# Patient Record
Sex: MALE | Race: WHITE | NOT HISPANIC OR LATINO | URBAN - METROPOLITAN AREA
[De-identification: names, ages, dates, MRNs, and addresses within clinical notes are randomized per-mention and may not be internally consistent; named-entity substitution may affect disease eponyms.]

---

## 2019-04-16 ENCOUNTER — IMPORTED ENCOUNTER (OUTPATIENT)
Dept: URBAN - METROPOLITAN AREA CLINIC 50 | Facility: CLINIC | Age: 69
End: 2019-04-16

## 2019-06-05 ENCOUNTER — IMPORTED ENCOUNTER (OUTPATIENT)
Dept: URBAN - METROPOLITAN AREA CLINIC 50 | Facility: CLINIC | Age: 69
End: 2019-06-05

## 2019-06-18 ENCOUNTER — IMPORTED ENCOUNTER (OUTPATIENT)
Dept: URBAN - METROPOLITAN AREA CLINIC 50 | Facility: CLINIC | Age: 69
End: 2019-06-18

## 2019-06-27 ENCOUNTER — IMPORTED ENCOUNTER (OUTPATIENT)
Dept: URBAN - METROPOLITAN AREA CLINIC 50 | Facility: CLINIC | Age: 69
End: 2019-06-27

## 2019-06-27 NOTE — PATIENT DISCUSSION
"""S/P IOL OS: Sensar AAB00 18.5 (Target: [place]) +Omidria.  Continue post operative instructions ""

## 2019-07-02 ENCOUNTER — IMPORTED ENCOUNTER (OUTPATIENT)
Dept: URBAN - METROPOLITAN AREA CLINIC 50 | Facility: CLINIC | Age: 69
End: 2019-07-02

## 2019-07-02 NOTE — PATIENT DISCUSSION
"""S/P IOL OS: Sensar AAB00 18.5 (Target: [place]) +Omidria.  Continue post operative instructions ""

## 2019-07-11 ENCOUNTER — IMPORTED ENCOUNTER (OUTPATIENT)
Dept: URBAN - METROPOLITAN AREA CLINIC 50 | Facility: CLINIC | Age: 69
End: 2019-07-11

## 2019-07-11 NOTE — PATIENT DISCUSSION
"""S/P IOL OD: Sensar AAB00 18.0 (Target: [place]) +Omidria. Continue post operative instructions and drops per schedule.  """

## 2019-07-16 ENCOUNTER — IMPORTED ENCOUNTER (OUTPATIENT)
Dept: URBAN - METROPOLITAN AREA CLINIC 50 | Facility: CLINIC | Age: 69
End: 2019-07-16

## 2019-07-16 NOTE — PATIENT DISCUSSION
"""S/P IOL OD: Sensar AAB00 18.0 (Target: [place]) +Omidria.  Continue post operative instructions and drops per schedule. "" ""Continue Lotemax Drops left eye twice a day

## 2019-08-06 ENCOUNTER — IMPORTED ENCOUNTER (OUTPATIENT)
Dept: URBAN - METROPOLITAN AREA CLINIC 50 | Facility: CLINIC | Age: 69
End: 2019-08-06

## 2019-08-06 NOTE — PATIENT DISCUSSION
"""S/P IOL OU: OD: Sensar AAB00 18.0 (Target: [place])Omidria.  OS: Sensar AAB00 18.5 (Target: ""

## 2019-12-03 ENCOUNTER — IMPORTED ENCOUNTER (OUTPATIENT)
Dept: URBAN - METROPOLITAN AREA CLINIC 50 | Facility: CLINIC | Age: 69
End: 2019-12-03

## 2021-04-17 ASSESSMENT — TONOMETRY
OS_IOP_MMHG: 12
OS_IOP_MMHG: 13
OD_IOP_MMHG: 15
OS_IOP_MMHG: 14
OS_IOP_MMHG: 14
OD_IOP_MMHG: 15
OD_IOP_MMHG: 14
OD_IOP_MMHG: 14
OS_IOP_MMHG: 15
OD_IOP_MMHG: 13
OD_IOP_MMHG: 12
OS_IOP_MMHG: 14
OD_IOP_MMHG: 12
OS_IOP_MMHG: 10

## 2021-04-17 ASSESSMENT — VISUAL ACUITY
OS_SC: 20/25-2
OD_BAT: 20/60
OS_PH: 20/50
OS_SC: 20/25-1
OD_SC: 20/60
OD_BAT: 20/25
OD_PH: 20/40-2
OS_OTHER: 20/25. 20/30.
OD_OTHER: 20/60. 20/400.
OD_OTHER: 20/60. 20/400.
OS_BAT: 20/80
OD_SC: 20/25
OD_CC: J3
OS_CC: 20/30-
OD_SC: 20/70
OS_BAT: 20/80
OD_SC: 20/50-
OS_BAT: 20/25
OS_SC: 20/20-1
OD_BAT: 20/60
OS_CC: J1+@ 16 IN
OD_SC: 20/20-1
OD_CC: J3@ 16 IN
OD_CC: J1+@ 16 IN
OD_OTHER: 20/60. 20/400.
OS_SC: 20/70+1
OD_BAT: 20/60
OS_CC: J3@ 16 IN
OS_SC: 20/20-2
OD_SC: 20/60-1
OS_OTHER: 20/80. 20/80.
OS_CC: J3
OS_OTHER: 20/80. >20/400.
OD_SC: 20/30
OS_SC: 20/60-1

## 2025-01-03 ENCOUNTER — NEW PATIENT (OUTPATIENT)
Age: 75
End: 2025-01-03

## 2025-01-03 DIAGNOSIS — H33.011: ICD-10-CM

## 2025-01-03 PROCEDURE — 99202 OFFICE O/P NEW SF 15 MIN: CPT

## 2025-01-03 PROCEDURE — 92134 CPTRZ OPH DX IMG PST SGM RTA: CPT
